# Patient Record
Sex: FEMALE | Race: WHITE
[De-identification: names, ages, dates, MRNs, and addresses within clinical notes are randomized per-mention and may not be internally consistent; named-entity substitution may affect disease eponyms.]

---

## 2022-01-23 ENCOUNTER — HOSPITAL ENCOUNTER (EMERGENCY)
Dept: HOSPITAL 47 - EC | Age: 27
Discharge: HOME | End: 2022-01-23
Payer: COMMERCIAL

## 2022-01-23 VITALS — HEART RATE: 68 BPM | DIASTOLIC BLOOD PRESSURE: 71 MMHG | RESPIRATION RATE: 18 BRPM | SYSTOLIC BLOOD PRESSURE: 134 MMHG

## 2022-01-23 VITALS — TEMPERATURE: 98.5 F

## 2022-01-23 DIAGNOSIS — F17.200: ICD-10-CM

## 2022-01-23 DIAGNOSIS — H66.91: ICD-10-CM

## 2022-01-23 DIAGNOSIS — U07.1: Primary | ICD-10-CM

## 2022-01-23 PROCEDURE — 99283 EMERGENCY DEPT VISIT LOW MDM: CPT

## 2022-01-23 PROCEDURE — 87635 SARS-COV-2 COVID-19 AMP PRB: CPT

## 2022-01-23 PROCEDURE — 71046 X-RAY EXAM CHEST 2 VIEWS: CPT

## 2022-01-23 NOTE — XR
EXAMINATION TYPE: XR chest 2V

 

DATE OF EXAM: 1/23/2022

 

COMPARISON: NONE

 

HISTORY: Cough and pain

 

TECHNIQUE: 2 view

 

FINDINGS: Heart and mediastinum are normal. Lungs are clear. Diaphragm is normal. Bony thorax is inta
ct.

 

IMPRESSION: Normal chest.

## 2022-11-16 ENCOUNTER — HOSPITAL ENCOUNTER (OUTPATIENT)
Dept: HOSPITAL 47 - RADUSWWP | Age: 27
Discharge: HOME | End: 2022-11-16
Attending: OBSTETRICS & GYNECOLOGY
Payer: COMMERCIAL

## 2022-11-16 DIAGNOSIS — N92.6: Primary | ICD-10-CM

## 2022-11-16 PROCEDURE — 76856 US EXAM PELVIC COMPLETE: CPT

## 2022-11-16 PROCEDURE — 76830 TRANSVAGINAL US NON-OB: CPT

## 2022-11-16 NOTE — US
EXAMINATION TYPE: US pelvis complete transvag

 

DATE OF EXAM: 11/16/2022

 

COMPARISON: NONE

 

CLINICAL HISTORY: N92.6 IRREGULAR BLEEDING. Pelvic pain for 1 year.  Postcoital bleeding.  No periods
 for 5 years due to depo provera.

 

TECHNIQUE: .  Transabdominal sonographic images of the pelvis were acquired.  Transvaginal sonographi
c images were medically necessary to better assess the following anatomy:

 

Date of LMP:  Unknown

 

EXAM MEASUREMENTS:

 

Uterus:  6.9 x 3.4 x 3.4 cm

Endometrial Stripe: 0.79 cm

Right Ovary:  1.4 x 1.3 x 1.3 cm

Left Ovary:  2.0 x 1.3 x 1.5 cm

 

 

 

1. Uterus:  Anteverted   wnl

2. Endometrium:  wnl

3. Right Ovary:  wnl

4. Left Ovary:  wnl

5. Bilateral Adnexa:  wnl

6. Posterior cul-de-sac:  wnl

 

 

 

IMPRESSION:

1.  No evidence for acute process.

2.  Endometrium within normal limits for thickness.